# Patient Record
Sex: MALE | Employment: STUDENT | URBAN - METROPOLITAN AREA
[De-identification: names, ages, dates, MRNs, and addresses within clinical notes are randomized per-mention and may not be internally consistent; named-entity substitution may affect disease eponyms.]

---
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
## 2025-01-22 ENCOUNTER — ATHLETIC TRAINING SESSION (OUTPATIENT)
Dept: SPORTS MEDICINE | Facility: CLINIC | Age: 20
End: 2025-01-22

## 2025-01-22 DIAGNOSIS — Z90.49 S/P LAPAROSCOPIC APPENDECTOMY: Primary | ICD-10-CM

## 2025-01-22 NOTE — PROGRESS NOTES
Reason for Encounter Follow-Up    Subjective:       Chief Complaint: Nahun Anguiano is a 19 y.o. male student at [employer] who had concerns including appendectomy.    Athlete will begin RTP for laparoscopic appendectomy after seeing Dr. Soares. Was cleared to progress with activity in segments before full practices. Athlete feels good to go and is not taking any medications currently.    Due to snow athlete is modifying activity to dorm room/gym and safely in the parking lot where ice and snow is cleared.     1/22/25 - did not do the weighted ball due to no access, will assess in ATR upon return from snow    1/25/25 - COD @px  Base runs full  Cone drills  Slides - plate/pop ups    1/26/25 - off day      Sport played: baseball      Level: college                Assessment:     Status: O - Out    Date Seen:  1/20/2025    Date of Injury:  12/16/2024    Date Out:  1/14/2025    Date Cleared:  n/a        Treatment/Rehab/Maintenance:     Amel completed therapeutic exercises to develop core stabilization:    Subjective:   1/20/25  Exercise Reps/Sets/Time Weight #   Light cardio - walk to jog x6    Jog to sprint x6         Bike - medium intensity 25min                                                   Subjective:   1/21/25  Exercise Reps/Sets/Time Weight #   Intense cardio - inline base sprints     60 yd dash  x6    90 yd dash x6         Bike - intense w/ high resistance 20min                                              Subjective:   1/22/25  Exercise Reps/Sets/Time Weight #   HIIT/Plyo     Jumping jacks 3x30    Sit ups 3x20    pushups 3x15    Box jumps 3x12              Overhead med ball slams 3x8    Wall throws w/ ball 3x8    Palof rotation 3x8    Med ball D1/D2 seated throws 3x8                Subjective:   1/23/25  Exercise Reps/Sets/Time Weight #   Jog 20min    60 yd sprints x6    90 yd sprints x6                                                          Subjective:   1/24/25 Heavy weights session w/ Stone  Exercise  Reps/Sets/Time Weight #   PVC Mobility     Floor Hip mobility/activation      Hex bar DL     Box jump     DB reverse lunge     Physio ball leg curls     DB incline bench     DB Chest supported incline row     Front/side raises     Band oblique twists     Side planks     Pull ups              Subjective:   1/25/25 bodyweight pre-px  Exercise Reps/Sets/Time Weight #   sprints     Depth drop -> jump     Deep squat jumps     Zheng hops sequence                                                    Plan:       1. Athlete will continue to progress pending no issues or any pain.  2. Physician Referral: yes  3. ED Referral:no  4. Parent/Guardian Notified: No  5. All questions were answered, ath. will contact me for questions or concerns in  the interim.  6.         Eligible to use School Insurance: No, not a school related injury

## 2025-02-02 ENCOUNTER — ATHLETIC TRAINING SESSION (OUTPATIENT)
Dept: SPORTS MEDICINE | Facility: CLINIC | Age: 20
End: 2025-02-02

## 2025-02-02 DIAGNOSIS — Z90.49 S/P LAPAROSCOPIC APPENDECTOMY: Primary | ICD-10-CM

## 2025-02-03 NOTE — PROGRESS NOTES
Reason for Encounter Follow-Up    Subjective:       Chief Complaint: Nahun Anguiano is a 19 y.o. male student at [employer] who had concerns including Health Maintenance of the Right Hip.    Athlete is doing well and no issus as progression to full practice continues.       Sport played: baseball      Level: college                Assessment:     Status: AT - Cleared to Exert    Date Seen:  1/27/2025    Date of Injury:  12/16/2024    Date Out:  n/a    Date Cleared:  1/30/2025        Treatment/Rehab/Maintenance:     Amel completed therapeutic exercises to develop endurance:    Subjective:   1/27/25 - 1st day px  Exercise Reps/Sets/Time Weight #   50%     Infield/outfield     Slides/dives     Throw bullpen (if they want to)                                                    Subjective:   1/28/25   Exercise Reps/Sets/Time Weight #   75%     Throwing intervals   30ft - 60ft - 90ft     bunt     Field ground balls                                                    Subjective:   1/29/25  Exercise Reps/Sets/Time Weight #   85%     Hitting BP     Throwing intervals  90ft - 120 ft - 160 ft - 180ft                                                         Subjective:   1/30/25  Exercise Reps/Sets/Time Weight #   100%     Full contact px no restrictions                                                                Plan:       1. Athlete will begin normal practices and lifts and come in PRN.  2. Physician Referral: yes  3. ED Referral:no  4. Parent/Guardian Notified: No  5. All questions were answered, ath. will contact me for questions or concerns in  the interim.  6.         Eligible to use School Insurance: No, not a school related injury